# Patient Record
Sex: FEMALE | Race: WHITE | URBAN - METROPOLITAN AREA
[De-identification: names, ages, dates, MRNs, and addresses within clinical notes are randomized per-mention and may not be internally consistent; named-entity substitution may affect disease eponyms.]

---

## 2019-03-26 ENCOUNTER — OFFICE VISIT (OUTPATIENT)
Dept: INTERNAL MEDICINE CLINIC | Age: 54
End: 2019-03-26

## 2019-03-26 VITALS
RESPIRATION RATE: 18 BRPM | WEIGHT: 155 LBS | TEMPERATURE: 98.1 F | DIASTOLIC BLOOD PRESSURE: 66 MMHG | SYSTOLIC BLOOD PRESSURE: 104 MMHG | HEIGHT: 68 IN | HEART RATE: 66 BPM | OXYGEN SATURATION: 99 % | BODY MASS INDEX: 23.49 KG/M2

## 2019-03-26 DIAGNOSIS — Z00.00 ENCOUNTER FOR PREVENTIVE HEALTH EXAMINATION: Primary | ICD-10-CM

## 2019-03-26 PROBLEM — M72.0 DUPUYTREN'S DISEASE OF PALM OF BOTH HANDS: Status: ACTIVE | Noted: 2019-03-26

## 2019-03-26 NOTE — PROGRESS NOTES
Chief Complaint Patient presents with  Physical  
 
1. Have you been to the ER, urgent care clinic since your last visit? Hospitalized since your last visit? No 
 
2. Have you seen or consulted any other health care providers outside of the [address] [place] [address] since your last visit? Include any pap smears or colon screening.  No

## 2019-03-26 NOTE — PATIENT INSTRUCTIONS
Assess overall health status Colonoscopy report from Dr. Hernandez Every Awaiting hormone panel from Dr. Mark Carlos to determine if you are on menopause Skin protection:  Surveillance per Dr Mian Haynes Immunizations:  Consider Shingrix

## 2019-03-26 NOTE — PROGRESS NOTES
HPI:  
 
This markos lady presents to the office for her annual wellness visit and did not have labs drawn in anticipation of this visit. Her records from [facility] have not been received from the [facility] for review and [address] [place] [address] was queried and information populated into EMR. She was recently evaluated by Dr. Donte Ziegler for \"spotting\" and pelvic ultrasound did not show endometrial thickening but had small fibroids. He suspects that this is related to menopause and he ordered hormone panel. She has a follow up arranged with Dr. Gabriela Mayberry. She has been labeled as high risk for breast cancer (mother and maternal aunt both had breast cancer) and has had alternating MR imaging and mammograms that have been negative. I do not know if genetic counseling has been performed. She is seeing a chiropractor for neck and back pain which she ascribes to inactivity. The adjustments have helped. She is not on a HBE program.  
 
She also has intermittent pain in her left knee which she notices when she gets up and down the steps. There is no instability of swelling. She had prior MRI that was unrevealing and has received formal orthopedic opinion from [facility] but that note is not on file. She had a recent skin evaluation by Dr. Janae Castillo and no suspicious lesions found. She has been using regular sun protection. I do not have a copy of the consultation note for review. Past Medical History:  
Diagnosis Date  Dupuytren's disease of palm of both hands 3/26/2019 Past Surgical History:  
Procedure Laterality Date  HX ORTHOPAEDIC No current outpatient medications on file. No current facility-administered medications for this visit. Allergies and Intolerances:  
No Known Allergies Family History:  
Family History Problem Relation Age of Onset  Cancer Mother  Dementia Father  Thyroid Disease Sister Social History: She  reports that she has never smoked. She has never used smokeless tobacco.  
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 HENT: Negative for hearing loss and tinnitus. Eyes: Negative for blurred vision. Cardiovascular: Negative for chest pain, palpitations and PND. Gastrointestinal: Negative for abdominal pain, blood in stool and heartburn. Genitourinary: Negative for dysuria and urgency. Musculoskeletal: Positive for back pain and neck pain. Neurological: Negative for dizziness and headaches. Psychiatric/Behavioral: Negative for depression and memory loss. The patient does not have insomnia. Physical:  
Visit Vitals /66 (BP 1 Location: Left arm, BP Patient Position: Sitting) Pulse 66 Temp 98.1 °F (36.7 °C) (Oral) Resp 18 Ht 5' 8\" (1.727 m) Wt 155 lb (70.3 kg) LMP 02/05/2019 SpO2 99% BMI 23.57 kg/m² Physical Exam  
Constitutional: She is well-developed, well-nourished, and in no distress. HENT:  
Right Ear: External ear normal.  
Left Ear: External ear normal.  
Mouth/Throat: No oropharyngeal exudate. Eyes: Conjunctivae are normal. No scleral icterus. Neck: Neck supple. No JVD present. No tracheal deviation present. Cardiovascular: Normal rate, regular rhythm, normal heart sounds and intact distal pulses. Exam reveals no gallop. No murmur heard. Pulmonary/Chest: Breath sounds normal. She has no wheezes. She has no rales. No axillary nodes Abdominal: Soft. Bowel sounds are normal. She exhibits no distension. There is no hepatosplenomegaly. There is no rebound. Musculoskeletal: Normal range of motion. She exhibits no edema. Scars both palms, palpable cords along tendon right pinky, no LS spine tenderness, no peripheral synovitis Lymphadenopathy:  
  She has no cervical adenopathy. Neurological: She has normal strength. Gait normal.  
Reflex Scores: 
     Patellar reflexes are 0 on the right side and 0 on the left side. Achilles reflexes are 0 on the right side and 0 on the left side. Skin:  
Cool hands and feet without digital cyanosis or ulcer, freckled skin Psychiatric: Affect normal.  
 
 
Review of Data: 
Labs: 
No visits with results within 3 Month(s) from this visit. Latest known visit with results is: No results found for any previous visit. Impression: 
 Patient Active Problem List  
Diagnosis  Code Screening/Wellness Screening services current, Shingrix discussed, ASA not indicated, fasting labs ordered with attention to lipid panel, TSH and Vitamin D Z0.00 Neck and back pain likely myofascial Continue chiropractic adjustments and encourage home based care (stretching and core) M54.5, M54.2 Spotting likely related to menopause Hormone panel ordered and GYN evaluation current and reassuring N93.9 Ephelides and Nevi Sun protection and regular skin surveillance through Dr. Ruchi Thapa I78.1  Dupuytren's disease of palm of both hands Monitor clinically M72.0 Markus Beckman MD

## 2019-05-01 LAB
25(OH)D3+25(OH)D2 SERPL-MCNC: 29.8 NG/ML (ref 30–100)
ALBUMIN SERPL-MCNC: 4.9 G/DL (ref 3.5–5.5)
ALBUMIN/GLOB SERPL: 1.8 {RATIO} (ref 1.2–2.2)
ALP SERPL-CCNC: 55 IU/L (ref 39–117)
ALT SERPL-CCNC: 21 IU/L (ref 0–32)
APPEARANCE UR: CLEAR
AST SERPL-CCNC: 29 IU/L (ref 0–40)
BACTERIA #/AREA URNS HPF: ABNORMAL /[HPF]
BILIRUB SERPL-MCNC: 0.5 MG/DL (ref 0–1.2)
BILIRUB UR QL STRIP: NEGATIVE
BUN SERPL-MCNC: 13 MG/DL (ref 6–24)
BUN/CREAT SERPL: 16 (ref 9–23)
CALCIUM SERPL-MCNC: 9.9 MG/DL (ref 8.7–10.2)
CASTS URNS QL MICRO: ABNORMAL /LPF
CHLORIDE SERPL-SCNC: 99 MMOL/L (ref 96–106)
CHOLEST SERPL-MCNC: 213 MG/DL (ref 100–199)
CO2 SERPL-SCNC: 24 MMOL/L (ref 20–29)
COLOR UR: YELLOW
CREAT SERPL-MCNC: 0.81 MG/DL (ref 0.57–1)
EPI CELLS #/AREA URNS HPF: ABNORMAL /HPF (ref 0–10)
ERYTHROCYTE [DISTWIDTH] IN BLOOD BY AUTOMATED COUNT: 13.7 % (ref 12.3–15.4)
GLOBULIN SER CALC-MCNC: 2.8 G/DL (ref 1.5–4.5)
GLUCOSE SERPL-MCNC: 73 MG/DL (ref 65–99)
GLUCOSE UR QL: NEGATIVE
HCT VFR BLD AUTO: 37 % (ref 34–46.6)
HDLC SERPL-MCNC: 78 MG/DL
HGB BLD-MCNC: 12.8 G/DL (ref 11.1–15.9)
HGB UR QL STRIP: ABNORMAL
KETONES UR QL STRIP: NEGATIVE
LDLC SERPL CALC-MCNC: 120 MG/DL (ref 0–99)
LEUKOCYTE ESTERASE UR QL STRIP: ABNORMAL
MCH RBC QN AUTO: 32.1 PG (ref 26.6–33)
MCHC RBC AUTO-ENTMCNC: 34.6 G/DL (ref 31.5–35.7)
MCV RBC AUTO: 93 FL (ref 79–97)
MICRO URNS: ABNORMAL
NITRITE UR QL STRIP: POSITIVE
PH UR STRIP: 6.5 [PH] (ref 5–7.5)
PLATELET # BLD AUTO: 287 X10E3/UL (ref 150–379)
POTASSIUM SERPL-SCNC: 4.1 MMOL/L (ref 3.5–5.2)
PROT SERPL-MCNC: 7.7 G/DL (ref 6–8.5)
PROT UR QL STRIP: NEGATIVE
RBC # BLD AUTO: 3.99 X10E6/UL (ref 3.77–5.28)
RBC #/AREA URNS HPF: ABNORMAL /HPF (ref 0–2)
SODIUM SERPL-SCNC: 138 MMOL/L (ref 134–144)
SP GR UR: 1.01 (ref 1–1.03)
SPECIMEN STATUS REPORT, ROLRST: NORMAL
TRIGL SERPL-MCNC: 76 MG/DL (ref 0–149)
TSH SERPL DL<=0.005 MIU/L-ACNC: 1.87 UIU/ML (ref 0.45–4.5)
UROBILINOGEN UR STRIP-MCNC: 0.2 MG/DL (ref 0.2–1)
VLDLC SERPL CALC-MCNC: 15 MG/DL (ref 5–40)
WBC # BLD AUTO: 7.3 X10E3/UL (ref 3.4–10.8)
WBC #/AREA URNS HPF: ABNORMAL /HPF (ref 0–5)

## 2019-05-08 ENCOUNTER — OFFICE VISIT (OUTPATIENT)
Dept: INTERNAL MEDICINE CLINIC | Age: 54
End: 2019-05-08

## 2019-05-08 VITALS
TEMPERATURE: 97.9 F | OXYGEN SATURATION: 99 % | HEIGHT: 68 IN | RESPIRATION RATE: 18 BRPM | SYSTOLIC BLOOD PRESSURE: 93 MMHG | BODY MASS INDEX: 23.04 KG/M2 | DIASTOLIC BLOOD PRESSURE: 66 MMHG | HEART RATE: 73 BPM | WEIGHT: 152 LBS

## 2019-05-08 DIAGNOSIS — J01.10 ACUTE NON-RECURRENT FRONTAL SINUSITIS: Primary | ICD-10-CM

## 2019-05-08 RX ORDER — METHYLPREDNISOLONE 4 MG/1
TABLET ORAL
Qty: 1 DOSE PACK | Refills: 0 | Status: SHIPPED | OUTPATIENT
Start: 2019-05-08

## 2019-05-08 RX ORDER — AZITHROMYCIN 250 MG/1
TABLET, FILM COATED ORAL
Qty: 6 TAB | Refills: 0 | Status: SHIPPED | OUTPATIENT
Start: 2019-05-08 | End: 2019-05-13

## 2019-05-08 NOTE — PROGRESS NOTES
Chief Complaint Patient presents with  Sinus Infection  
  cough and congestion 1. Have you been to the ER, urgent care clinic since your last visit? Hospitalized since your last visit? No 
 
2. Have you seen or consulted any other health care providers outside of the [address] [place] [address] since your last visit? Include any pap smears or colon screening.  No

## 2019-05-08 NOTE — PROGRESS NOTES
HPI:  
 
This markos lady presents to the office on an urgent basis because of 3 day history of sinus congestion, facial pain, vocal hoarseness and recent expectoration of yellow secretions. She denies any fever or chills. She tool Benadryl which allowed her to sleep at night. Past Medical History:  
Diagnosis Date  Dupuytren's disease of palm of both hands 3/26/2019 Past Surgical History:  
Procedure Laterality Date  HX ORTHOPAEDIC Current Outpatient Medications Medication Sig  
 azithromycin (ZITHROMAX) 250 mg tablet Take 2 tablets today, then take 1 tablet daily  methylPREDNISolone (MEDROL, SILVIA,) 4 mg tablet Use as directed No current facility-administered medications for this visit. Allergies and Intolerances:  
No Known Allergies Family History:  
Family History Problem Relation Age of Onset  Cancer Mother  Dementia Father  Thyroid Disease Sister Social History: She  reports that she has never smoked. She has never used smokeless tobacco.  
Social History Substance and Sexual Activity Alcohol Use Yes  Alcohol/week: 4.2 - 8.4 oz  Types: 7 - 14 Shots of liquor per week Physical:  
Visit Vitals BP 93/66 (BP 1 Location: Left arm, BP Patient Position: Sitting) Pulse 73 Temp 97.9 °F (36.6 °C) (Oral) Resp 18 Ht 5' 8\" (1.727 m) Wt 152 lb (68.9 kg) LMP 02/05/2019 SpO2 99% BMI 23.11 kg/m² Physical Exam  
HENT:  
Mucoid effusion both TMs, non injected, no facial tenderness, clear pharynx without injection Eyes: Conjunctivae are normal. No scleral icterus. Cardiovascular: Normal rate, regular rhythm and normal heart sounds. Pulmonary/Chest: Breath sounds normal. She has no wheezes. Lymphadenopathy:  
  She has no cervical adenopathy. Vitals reviewed.  
  
Diagnoses and all orders for this visit: 
 
Acute non-recurrent frontal sinusitis 
-     methylPREDNISolone (MEDROL, SILVIA,) 4 mg tablet; Use as directed, Normal, Disp-1 Dose Pack, R-0 
-     azithromycin (ZITHROMAX) 250 mg tablet; Take 2 tablets today, then take 1 tablet daily, Normal, Disp-6 Tab,  
-     saline nasal spray, fluids, rest, avoid dairy products Matheus Noguera MD